# Patient Record
(demographics unavailable — no encounter records)

---

## 2019-01-01 NOTE — NUR
BREASTFEEDING ASSIST
I SAW PT. EARLY IN SHIFT AND BREASTFEEDING ASSIST WAS DONE. PT. REQUESTING
HELP WITH FEEDING. MOM PLACE IN LAID BACK POSITION
I REMOVED SHIELD AND PLACED
BABY ON NATURAL BREAST, BABY NURSED WITH A WIDE DEEP LATCH MOM RELAXED DURING
THE FEEDING. MOM IS AWARE THAT BABY CAN NURSE WITHOUT THE SHIELD BUT SHE MAY
STILL USE IT IF SHE IS UNABLE TO LATCH WITH OUT IT. MOM TO PRACTICE WITH OUT
SHILED PRN.

## 2019-01-01 NOTE — NUR
Report from GALA Joseph. Assumed care. When introducing myself to the patient, i
walked in the room and she was asleep in the bed holding the baby. Jake woke
her up and put the nb back in the bassinette. Jake reminded her to put baby in
bassinette when she is going to fall asleep, the pt stated that she doesn't
even k now when she is falling asleep. I recommended that when she is tired
and deeding to feed the baby, to set a 15-20 minute timer on her phone, and
that if she falls asleep, the alarm will go off and remind her to put baby
down before going back to sleep. She verbalized agreement with this
suggestion.